# Patient Record
Sex: MALE | Race: WHITE | NOT HISPANIC OR LATINO | Employment: OTHER | URBAN - METROPOLITAN AREA
[De-identification: names, ages, dates, MRNs, and addresses within clinical notes are randomized per-mention and may not be internally consistent; named-entity substitution may affect disease eponyms.]

---

## 2024-08-02 ENCOUNTER — HOSPITAL ENCOUNTER (EMERGENCY)
Facility: HOSPITAL | Age: 59
Discharge: HOME | End: 2024-08-02
Attending: EMERGENCY MEDICINE

## 2024-08-02 VITALS
OXYGEN SATURATION: 99 % | RESPIRATION RATE: 16 BRPM | HEIGHT: 69 IN | WEIGHT: 145 LBS | SYSTOLIC BLOOD PRESSURE: 107 MMHG | TEMPERATURE: 98.2 F | DIASTOLIC BLOOD PRESSURE: 55 MMHG | HEART RATE: 61 BPM | BODY MASS INDEX: 21.48 KG/M2

## 2024-08-02 DIAGNOSIS — S01.81XA CHIN LACERATION, INITIAL ENCOUNTER: Primary | ICD-10-CM
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
## 2024-08-02 PROCEDURE — 12014 RPR F/E/E/N/L/M 5.1-7.5 CM: CPT

## 2024-08-02 PROCEDURE — 12014 RPR F/E/E/N/L/M 5.1-7.5 CM: CPT | Performed by: EMERGENCY MEDICINE

## 2024-08-02 PROCEDURE — 90471 IMMUNIZATION ADMIN: CPT

## 2024-08-02 PROCEDURE — 99284 EMERGENCY DEPT VISIT MOD MDM: CPT | Performed by: EMERGENCY MEDICINE

## 2024-08-02 PROCEDURE — 2500000005 HC RX 250 GENERAL PHARMACY W/O HCPCS

## 2024-08-02 PROCEDURE — 99283 EMERGENCY DEPT VISIT LOW MDM: CPT

## 2024-08-02 PROCEDURE — 2500000004 HC RX 250 GENERAL PHARMACY W/ HCPCS (ALT 636 FOR OP/ED)

## 2024-08-02 PROCEDURE — 90715 TDAP VACCINE 7 YRS/> IM: CPT

## 2024-08-02 RX ORDER — LIDOCAINE HYDROCHLORIDE AND EPINEPHRINE 20; 10 MG/ML; UG/ML
20 INJECTION, SOLUTION INFILTRATION; PERINEURAL ONCE
Status: COMPLETED | OUTPATIENT
Start: 2024-08-02 | End: 2024-08-02

## 2024-08-02 RX ADMIN — LIDOCAINE HYDROCHLORIDE,EPINEPHRINE BITARTRATE 20 ML: 20; .01 INJECTION, SOLUTION INFILTRATION; PERINEURAL at 05:05

## 2024-08-02 RX ADMIN — TETANUS TOXOID, REDUCED DIPHTHERIA TOXOID AND ACELLULAR PERTUSSIS VACCINE, ADSORBED 0.5 ML: 5; 2.5; 8; 8; 2.5 SUSPENSION INTRAMUSCULAR at 05:21

## 2024-08-02 ASSESSMENT — LIFESTYLE VARIABLES
HAVE YOU EVER FELT YOU SHOULD CUT DOWN ON YOUR DRINKING: NO
TOTAL SCORE: 0
EVER HAD A DRINK FIRST THING IN THE MORNING TO STEADY YOUR NERVES TO GET RID OF A HANGOVER: NO
EVER FELT BAD OR GUILTY ABOUT YOUR DRINKING: NO
HAVE PEOPLE ANNOYED YOU BY CRITICIZING YOUR DRINKING: NO

## 2024-08-02 ASSESSMENT — PAIN SCALES - GENERAL: PAINLEVEL_OUTOF10: 4

## 2024-08-02 ASSESSMENT — COLUMBIA-SUICIDE SEVERITY RATING SCALE - C-SSRS
2. HAVE YOU ACTUALLY HAD ANY THOUGHTS OF KILLING YOURSELF?: NO
1. IN THE PAST MONTH, HAVE YOU WISHED YOU WERE DEAD OR WISHED YOU COULD GO TO SLEEP AND NOT WAKE UP?: NO
6. HAVE YOU EVER DONE ANYTHING, STARTED TO DO ANYTHING, OR PREPARED TO DO ANYTHING TO END YOUR LIFE?: NO

## 2024-08-02 ASSESSMENT — PAIN - FUNCTIONAL ASSESSMENT: PAIN_FUNCTIONAL_ASSESSMENT: 0-10

## 2024-08-02 ASSESSMENT — PAIN DESCRIPTION - PROGRESSION: CLINICAL_PROGRESSION: NOT CHANGED

## 2024-08-02 NOTE — ED PROVIDER NOTES
EMERGENCY DEPARTMENT ENCOUNTER      Pt Name: Raciel Nance  MRN: [id number]  Birthdate 1965  Date of evaluation: 8/2/2024  Provider: Marvel Tinoco DO    CHIEF COMPLAINT       Chief Complaint   Patient presents with    Fall     Pt endorses feeling nauseous and dizzy then had a fall expiriencing LOC. Pt presents with apprx 3cm lac over the chin with nausea.          HISTORY OF PRESENT ILLNESS    This is a 58-year-old male who denies all medical history who states that he arrived to the emergency department the chief complaint of a laceration on his chin.  Reportedly the patient states that he was drinking alcohol earlier and drinking cold brew coffee and believes he was dehydrated.  He states he was standing to pee when he had a brief syncopal episode and struck his chin on the toilet.  The patient states that he refuses all medical care at this point other than laceration repair and tetanus update.  Patient states that he does not want CT imaging, he does not want laboratory work performed, does not want twelve-lead ECG.      History provided by:  Patient      Nursing Notes were reviewed.    PAST MEDICAL HISTORY   History reviewed. No pertinent past medical history.      SURGICAL HISTORY     History reviewed. No pertinent surgical history.      CURRENT MEDICATIONS       Previous Medications    No medications on file       ALLERGIES     Shellfish derived    FAMILY HISTORY     No family history on file.       SOCIAL HISTORY       Social History     Tobacco Use    Smoking status: Never    Smokeless tobacco: Never   Substance and Sexual Activity    Alcohol use: Yes     Comment: occational    Drug use: Yes     Types: Marijuana       SCREENINGS                        PHYSICAL EXAM    (up to 7 for level 4, 8 or more for level 5)     ED Triage Vitals [08/02/24 0409]   Temperature Heart Rate Respirations BP   36.7 °C (98.1 °F) 63 16 95/57      Pulse Ox Temp src Heart Rate Source Patient Position   99 % -- -- --       BP Location FiO2 (%)     Right arm --       Physical Exam  Vitals and nursing note reviewed.   Constitutional:       General: He is not in acute distress.     Appearance: Normal appearance. He is normal weight. He is not ill-appearing or toxic-appearing.   HENT:      Head: Normocephalic.        Nose: Nose normal. No congestion or rhinorrhea.      Mouth/Throat:      Mouth: Mucous membranes are moist.      Pharynx: Oropharynx is clear. No oropharyngeal exudate or posterior oropharyngeal erythema.   Cardiovascular:      Rate and Rhythm: Normal rate and regular rhythm.      Pulses: Normal pulses.      Heart sounds: Normal heart sounds.   Pulmonary:      Effort: Pulmonary effort is normal. No respiratory distress.      Breath sounds: Normal breath sounds. No stridor. No wheezing or rhonchi.   Abdominal:      General: Abdomen is flat. There is no distension.      Palpations: Abdomen is soft. There is no mass.      Tenderness: There is no abdominal tenderness.      Hernia: No hernia is present.   Musculoskeletal:         General: No swelling, tenderness, deformity or signs of injury. Normal range of motion.   Skin:     General: Skin is warm and dry.      Capillary Refill: Capillary refill takes less than 2 seconds.      Coloration: Skin is not jaundiced or pale.      Findings: No bruising or erythema.   Neurological:      General: No focal deficit present.      Mental Status: He is alert and oriented to person, place, and time.      Cranial Nerves: No cranial nerve deficit.      Sensory: No sensory deficit.      Motor: No weakness.      Coordination: Coordination normal.   Psychiatric:         Mood and Affect: Mood normal.         Behavior: Behavior normal.         Thought Content: Thought content normal.         Judgment: Judgment normal.          DIAGNOSTIC RESULTS     LABS:  Labs Reviewed - No data to display    All other labs were within normal range or not returned as of this dictation.    Imaging  No orders to  display        Procedures  Procedures     EMERGENCY DEPARTMENT COURSE/MDM:         Medical Decision Making  This is a 58-year-old male arrives with a chief complaint of chin laceration.  Patient has syncopal episode however he is refusing all care other than laceration repair and tetanus vaccination.  Multiple conversations were had with the patient by both me, nursing staff and attending physician.  Patient states he will not submit to CT of the head, cardiac evaluation, blood work or anything else.  He and his partner are adamant that they only wish for the laceration to be repaired.  Patient has capacity, is able to refuse care.  His chin laceration was repaired.  He was given a follow-up with a primary care physician.  Instructed to return to the emergency department 7 days have sutures removed.  Patient understands plan of care.  Patient discharged home.  Strict return precautions were given, and the patient was advised that he should have full cardiac and neurological evaluation performed.    Reviewed and discussed attending physician      Marvel Tinoco DO  PGY-3 Emergency Medicine        Patient and or family in agreement and understanding of treatment plan.  All questions answered.      I reviewed the case with the attending ED physician. The attending ED physician agrees with the plan. Patient and/or patient´s representative was counseled regarding labs, imaging, likely diagnosis, and plan. All questions were answered.    ED Medications administered this visit:  Medications - No data to display    New Prescriptions from this visit:    New Prescriptions    No medications on file       Follow-up:  No follow-up provider specified.      Final Impression: No diagnosis found.      (Please note that portions of this note were completed with a voice recognition program.  Efforts were made to edit the dictations but occasionally words are mis-transcribed.)     Marvel Tinoco DO  Resident  08/02/24  [id number]

## 2024-08-02 NOTE — ED PROCEDURE NOTE
Procedure  Laceration Repair    Performed by: Marvel Tinoco DO  Authorized by: Jnoo Caballero MD    Consent:     Consent obtained:  Verbal    Consent given by:  Patient    Risks, benefits, and alternatives were discussed: yes      Risks discussed:  Pain and poor wound healing    Alternatives discussed:  No treatment  Universal protocol:     Patient identity confirmed:  Verbally with patient and arm band  Anesthesia:     Anesthesia method:  Local infiltration  Laceration details:     Location:  Face    Face location:  Chin    Length (cm):  6    Depth (mm):  1  Pre-procedure details:     Preparation:  Patient was prepped and draped in usual sterile fashion  Exploration:     Limited defect created (wound extended): no      Hemostasis achieved with:  Direct pressure and epinephrine  Treatment:     Area cleansed with:  Saline and chlorhexidine    Amount of cleaning:  Standard    Irrigation solution:  Sterile saline  Skin repair:     Repair method:  Sutures    Suture size:  3-0    Suture material:  Prolene    Suture technique:  Simple interrupted    Number of sutures:  7  Approximation:     Approximation:  Close  Repair type:     Repair type:  Simple  Post-procedure details:     Dressing:  Sterile dressing    Procedure completion:  Tolerated               Marvel Tinoco DO  Resident  08/02/24 0557

## 2024-08-02 NOTE — DISCHARGE INSTRUCTIONS
Please call primary care provider and be evaluated.  Please note that you should have a neurological and cardiovascular evaluation done if you are having episodes where you are passing out or fainting.  Please return to the emergency department if you have another episode of passing out, if you develop chest pain shortness of breath nausea or vomiting.  You have been offered an opportunity to have a full and appropriate medical evaluation performed and have declined.  Please consider following up with a primary care provider to have an evaluation performed to rule out any heart or neurological problems.  You are to return within 7 days to have the laceration sutures removed.

## 2024-08-02 NOTE — ED NOTES
Pt taken to room and asked to remove coat and sweatshirt for monitoring.  Pt states he would like to speak to Dr. Dr Tinoco to room to see pt and explain what the plan of care will be.  Pt states he does not want CT or any testing done.  Pt's significant other states they only want stitches.  Dr Tinoco explained to pt and significant other that he can not just do stitches when the pt had a syncopal episode with no known cause.  Dr. Caballero in to talk to pt and is agreeable to providing stitches with pt refusing all medical treatment     Eden Delacruz, WALDO  08/02/24 0309

## 2025-05-17 ENCOUNTER — OFFICE VISIT (OUTPATIENT)
Dept: URGENT CARE | Age: 60
End: 2025-05-17
Payer: COMMERCIAL

## 2025-05-17 VITALS
SYSTOLIC BLOOD PRESSURE: 115 MMHG | DIASTOLIC BLOOD PRESSURE: 69 MMHG | OXYGEN SATURATION: 99 % | BODY MASS INDEX: 22.15 KG/M2 | TEMPERATURE: 97.9 F | HEART RATE: 69 BPM | WEIGHT: 150 LBS

## 2025-05-17 DIAGNOSIS — R59.0 CERVICAL LYMPHADENOPATHY: Primary | ICD-10-CM

## 2025-05-17 DIAGNOSIS — J02.9 SORE THROAT: ICD-10-CM

## 2025-05-17 RX ORDER — METHYLPREDNISOLONE 4 MG/1
TABLET ORAL
Qty: 21 TABLET | Refills: 0 | Status: SHIPPED | OUTPATIENT
Start: 2025-05-17

## 2025-05-17 RX ORDER — AMOXICILLIN AND CLAVULANATE POTASSIUM 875; 125 MG/1; MG/1
1 TABLET, FILM COATED ORAL 2 TIMES DAILY
Qty: 20 TABLET | Refills: 0 | Status: SHIPPED | OUTPATIENT
Start: 2025-05-17 | End: 2025-05-27

## 2025-05-17 ASSESSMENT — ENCOUNTER SYMPTOMS
COUGH: 0
FATIGUE: 1
ADENOPATHY: 1
CHILLS: 0
SORE THROAT: 1
FEVER: 0

## 2025-05-17 NOTE — PROGRESS NOTES
"Subjective   Patient ID: Raciel Nance is a 59 y.o. male. They present today with a chief complaint of Other (Patient complains of a swollen lymph node on the left side of his neck X3 weeks . Patient states the lymph node is growing in size and becoming more tender . Patient states he did has a small \" cold \" 1 week ago but states again the swollen lymph node started weeks before the \" cold \" . ).    History of Present Illness  HPI    Patient urgent care for complaints of a swollen lymph node on the left side of his neck for 3 weeks.  Patient reports that he had a cold 1 week ago.  Reports that the swollen lymph node started before then.  Past Medical History  Allergies as of 05/17/2025 - Reviewed 05/17/2025   Allergen Reaction Noted    Shellfish derived Nausea/vomiting 08/02/2024       Prescriptions Prior to Admission[1]     Medical History[2]    Surgical History[3]     reports that he has never smoked. He has never used smokeless tobacco. He reports current alcohol use. He reports current drug use. Drug: Marijuana.    Review of Systems  Review of Systems   Constitutional:  Positive for fatigue. Negative for chills and fever.   HENT:  Positive for sore throat. Negative for congestion and postnasal drip.    Respiratory:  Negative for cough.    Hematological:  Positive for adenopathy.                                  Objective    Vitals:    05/17/25 0852   BP: 115/69   BP Location: Left arm   Patient Position: Sitting   Pulse: 69   Temp: 36.6 °C (97.9 °F)   TempSrc: Oral   SpO2: 99%   Weight: 68 kg (150 lb)     No LMP for male patient.    Physical Exam  Vitals reviewed.   Constitutional:       Appearance: Normal appearance.   HENT:      Head: Normocephalic.      Right Ear: Tympanic membrane, ear canal and external ear normal.      Left Ear: Tympanic membrane, ear canal and external ear normal.      Nose: Nose normal.      Mouth/Throat:      Mouth: Mucous membranes are moist.      Pharynx: Posterior oropharyngeal " erythema present.   Eyes:      Conjunctiva/sclera: Conjunctivae normal.   Cardiovascular:      Rate and Rhythm: Normal rate and regular rhythm.   Pulmonary:      Effort: Pulmonary effort is normal.      Breath sounds: Normal breath sounds and air entry.   Musculoskeletal:      Cervical back: Neck supple.   Lymphadenopathy:      Cervical: Cervical adenopathy present.      Right cervical: No posterior cervical adenopathy.     Left cervical: Posterior cervical adenopathy present.   Skin:     General: Skin is warm and dry.   Neurological:      Mental Status: He is alert.         Procedures    Point of Care Test & Imaging Results from this visit  No results found for this visit on 05/17/25.   Imaging  No results found.    Cardiology, Vascular, and Other Imaging  No other imaging results found for the past 2 days      Diagnostic study results (if any) were reviewed by ELIZABETH Garsia.    Assessment/Plan   Allergies, medications, history, and pertinent labs/EKGs/Imaging reviewed by ELIZABETH Garsia.     Medical Decision Making  Provider wanted to test patient for strep and other viral tests.  Patient declined states that he has to go to work and did not have the time.  Will cover patient with Augmentin and Medrol Dosepak for potential bacterial infection.  Patient does not have a primary care provider and referral was placed.  Patient was told to follow-up with primary care if symptoms are not improving.  If you develop severe pain, difficulty breathing, or difficulty swallowing then go to the ER.    At time of discharge patient was clinically well-appearing and HDS for outpatient management. The patient and/or family was educated regarding diagnosis, supportive care, OTC and Rx medications. The patient and/or family was given the opportunity to ask questions prior to discharge.  They verbalized understanding of my discussion of the plans for treatment, expected course, indications to return to  or  seek further evaluation in ED, and the need for timely follow up as directed.   They were provided with a work/school excuse if requested.    Orders and Diagnoses  Diagnoses and all orders for this visit:  Cervical lymphadenopathy  -     Referral to Primary Care; Future  -     amoxicillin-clavulanate (Augmentin) 875-125 mg tablet; Take 1 tablet by mouth 2 times a day for 10 days.  -     methylPREDNISolone (Medrol Dospak) 4 mg tablets; Take as directed on package.  Sore throat  -     amoxicillin-clavulanate (Augmentin) 875-125 mg tablet; Take 1 tablet by mouth 2 times a day for 10 days.  -     methylPREDNISolone (Medrol Dospak) 4 mg tablets; Take as directed on package.      Medical Admin Record      Patient disposition: Home    Electronically signed by ELIZABETH Garsia  9:07 AM           [1] (Not in a hospital admission)  [2] No past medical history on file.  [3] No past surgical history on file.

## 2025-06-03 ENCOUNTER — OFFICE VISIT (OUTPATIENT)
Dept: URGENT CARE | Age: 60
End: 2025-06-03
Payer: COMMERCIAL

## 2025-06-03 VITALS
TEMPERATURE: 97.4 F | SYSTOLIC BLOOD PRESSURE: 107 MMHG | DIASTOLIC BLOOD PRESSURE: 65 MMHG | OXYGEN SATURATION: 97 % | RESPIRATION RATE: 16 BRPM | HEART RATE: 72 BPM

## 2025-06-03 DIAGNOSIS — R59.0 CERVICAL LYMPHADENOPATHY: Primary | ICD-10-CM

## 2025-06-03 ASSESSMENT — ENCOUNTER SYMPTOMS
DIAPHORESIS: 0
FEVER: 0
ADENOPATHY: 1
APPETITE CHANGE: 0
SHORTNESS OF BREATH: 0
CHILLS: 0
ACTIVITY CHANGE: 0
UNEXPECTED WEIGHT CHANGE: 0
HEADACHES: 0
FATIGUE: 0

## 2025-06-03 ASSESSMENT — PAIN SCALES - GENERAL: PAINLEVEL_OUTOF10: 0-NO PAIN

## 2025-06-03 NOTE — PROGRESS NOTES
"Subjective   Patient ID: Raciel Nance is a 59 y.o. male. They present today with a chief complaint of Neck Pain (Left sided cervical lymph node swelling. Pt was at  on 05/17/25 for same chief complaint, given antibiotics/steroid/ PCP referral. Pt states the meds \"didn't help much\" and he did not make an appointment with PCP yet. ).    History of Present Illness  HPI    Patient presents to urgent care for complaints of left side cervical lymph node swelling. Reports lymph node swelling for over a month. Patient was seen here a few weeks ago. He declined viral testing. He was placed on Augmentin and Medrol dosepak to cover for potential bacterial infection. Reports no improvement of symptoms. He was given a referral for a PCP and did not call for an appointment.     Past Medical History  Allergies as of 06/03/2025 - Reviewed 06/03/2025   Allergen Reaction Noted    Shellfish derived Nausea/vomiting 08/02/2024       Prescriptions Prior to Admission[1]     Medical History[2]    Surgical History[3]     reports that he has never smoked. He has never used smokeless tobacco. He reports current alcohol use. He reports current drug use. Drug: Marijuana.    Review of Systems  Review of Systems   Constitutional:  Negative for activity change, appetite change, chills, diaphoresis, fatigue, fever and unexpected weight change.   HENT:  Negative for congestion and ear pain.    Respiratory:  Negative for shortness of breath.    Cardiovascular:  Negative for chest pain.   Neurological:  Negative for headaches.   Hematological:  Positive for adenopathy.                                  Objective    Vitals:    06/03/25 1047   BP: 107/65   Pulse: 72   Resp: 16   Temp: 36.3 °C (97.4 °F)   SpO2: 97%     No LMP for male patient.    Physical Exam  Vitals reviewed.   Constitutional:       Appearance: Normal appearance.   HENT:      Head: Normocephalic.   Eyes:      Conjunctiva/sclera: Conjunctivae normal.   Cardiovascular:      Heart " sounds: Normal heart sounds.   Pulmonary:      Effort: Pulmonary effort is normal.      Breath sounds: Normal breath sounds.   Musculoskeletal:      Cervical back: Normal range of motion and neck supple. No rigidity.   Lymphadenopathy:      Cervical: Cervical adenopathy (left side) present.   Skin:     General: Skin is warm and dry.   Neurological:      Mental Status: He is alert.         Procedures    Point of Care Test & Imaging Results from this visit  No results found for this visit on 06/03/25.   Imaging  No results found.    Cardiology, Vascular, and Other Imaging  No other imaging results found for the past 2 days      Diagnostic study results (if any) were reviewed by ELIZABETH Garsia.    Assessment/Plan   Allergies, medications, history, and pertinent labs/EKGs/Imaging reviewed by ELIZABETH Garsia.     Medical Decision Making  Patient was told to call referral line to establish a PCP. He was told if he develops severe shortness of breath, chest pain, or difficulty swallowing to go straight to the ER.    At time of discharge patient was clinically well-appearing and HDS for outpatient management. The patient and/or family was educated regarding diagnosis, supportive care, OTC and Rx medications. The patient and/or family was given the opportunity to ask questions prior to discharge.  They verbalized understanding of my discussion of the plans for treatment, expected course, indications to return to  or seek further evaluation in ED, and the need for timely follow up as directed.   They were provided with a work/school excuse if requested.    Orders and Diagnoses  Diagnoses and all orders for this visit:  Cervical lymphadenopathy      Medical Admin Record      Patient disposition: Home    Electronically signed by ELIZABETH Garsia  10:52 AM           [1] (Not in a hospital admission)  [2] No past medical history on file.  [3] No past surgical history on file.

## 2025-06-03 NOTE — PATIENT INSTRUCTIONS
Referral Line: [phone].  Call and establish care with a PCP for routine labs.  If you develop difficultly swallowing, high fevers, shortness of breath, then go to the ER.